# Patient Record
Sex: FEMALE | Race: BLACK OR AFRICAN AMERICAN | NOT HISPANIC OR LATINO | Employment: FULL TIME | URBAN - METROPOLITAN AREA
[De-identification: names, ages, dates, MRNs, and addresses within clinical notes are randomized per-mention and may not be internally consistent; named-entity substitution may affect disease eponyms.]

---

## 2022-06-09 NOTE — PROGRESS NOTES
History & Physical    CHIEF COMPLAINT:  ABDOMINAL HERNIA     History of Present Illness:  31 year-old-female referred by Dr. Vásquez to evaluate a possible abdominal hernia.  Patient reports abdominal wall swelling in the periumbilical and epigastric region, exacerbated with straining and Valsalva.  She also reports occasional upper abdominal discomfort and bloating, especially after meals, mild nausea.  She denies emesis, constipation or obstructive symptoms.  She denies fevers chills or symptoms of jaundice.  She is 4 months postpartum, she is not planning further pregnancies at this time.  She reports no abdominal wall surgery or previous hernia repair.  I reviewed OBGYN progress notes discussed with them for coordination of care    Greater than 30 minutes was required for complete chart review, patient consultation and documentation      Review of patient's allergies indicates:   Allergen Reactions    Sulfa (sulfonamide antibiotics)      Other reaction(s): rash       No current outpatient medications on file.     No current facility-administered medications for this visit.       Past Medical History:   Diagnosis Date    Beta thalassemia     Ectopic pregnancy      Past Surgical History:   Procedure Laterality Date     SECTION      SALPINGECTOMY Right      No family history on file.        Review of Systems:  Review of Systems   Constitutional: Negative for activity change, appetite change, chills, diaphoresis, fatigue, fever and unexpected weight change.   HENT: Negative for congestion, drooling, ear discharge, ear pain, facial swelling, hearing loss, sinus pressure, sneezing and voice change.    Eyes: Negative for pain, discharge, redness, itching and visual disturbance.   Respiratory: Negative for apnea, cough, choking, chest tightness, shortness of breath and stridor.    Cardiovascular: Negative for chest pain, palpitations and leg swelling.   Gastrointestinal: Positive for abdominal distention  and abdominal pain. Negative for anal bleeding, blood in stool, constipation, diarrhea, nausea, rectal pain and vomiting.   Endocrine: Negative for cold intolerance, heat intolerance and polyuria.   Genitourinary: Negative for difficulty urinating, dyspareunia, dysuria, flank pain, frequency, hematuria, menstrual problem, vaginal bleeding and vaginal discharge.   Musculoskeletal: Negative for arthralgias, gait problem, joint swelling, myalgias, neck pain and neck stiffness.   Skin: Negative for color change, pallor and rash.   Neurological: Negative for dizziness, tremors, seizures, syncope, facial asymmetry, speech difficulty, light-headedness, numbness and headaches.   Hematological: Negative for adenopathy.   Psychiatric/Behavioral: Negative for agitation, behavioral problems and confusion.       OBJECTIVE:     Vital Signs (Most Recent)              Physical Exam:  Physical Exam  Constitutional:       General: She is not in acute distress.     Appearance: Normal appearance. She is not ill-appearing, toxic-appearing or diaphoretic.   HENT:      Head: Normocephalic and atraumatic.      Right Ear: External ear normal.      Left Ear: External ear normal.      Nose: No congestion or rhinorrhea.      Mouth/Throat:      Mouth: Mucous membranes are moist.      Pharynx: No oropharyngeal exudate or posterior oropharyngeal erythema.   Eyes:      General: No scleral icterus.     Conjunctiva/sclera: Conjunctivae normal.      Pupils: Pupils are equal, round, and reactive to light.   Neck:      Vascular: No carotid bruit.   Cardiovascular:      Rate and Rhythm: Normal rate and regular rhythm.      Pulses: Normal pulses.      Heart sounds: No murmur heard.    No friction rub. No gallop.   Pulmonary:      Effort: Pulmonary effort is normal. No respiratory distress.      Breath sounds: Normal breath sounds. No stridor. No wheezing or rhonchi.   Abdominal:      General: Abdomen is flat. There is no distension.      Palpations:  Abdomen is soft. There is no mass.      Tenderness: There is no abdominal tenderness. There is no right CVA tenderness, left CVA tenderness, guarding or rebound.      Hernia: No hernia is present.      Comments: Fullness with possible defect in the a abdominal wall at the umbilicus and in the epigastrium.  Due to the flaccid nature of her abdomen at this time it is difficult to delineate whether there is a fascial defect or not.   Musculoskeletal:         General: No swelling, tenderness or deformity. Normal range of motion.      Cervical back: Normal range of motion and neck supple. No rigidity or tenderness.      Right lower leg: No edema.      Left lower leg: No edema.   Skin:     General: Skin is warm.      Capillary Refill: Capillary refill takes less than 2 seconds.      Coloration: Skin is not jaundiced or pale.      Findings: No bruising, erythema or rash.   Neurological:      General: No focal deficit present.      Mental Status: She is alert and oriented to person, place, and time.      Gait: Gait normal.   Psychiatric:         Mood and Affect: Mood normal.         Behavior: Behavior normal.         Thought Content: Thought content normal.         Judgment: Judgment normal.           ASSESSMENT/PLAN:     Periumbilical upper abdominal wall bulging, unable to accurately identify fascial defect due to abdominal wall laxity  CT scan to further assess for abdominal wall defects/hernias  Patient with upper abdominal bloating and discomfort especially after meals, will obtain right upper quadrant/gallbladder ultrasound    Return to clinic to discuss results

## 2022-06-14 ENCOUNTER — OFFICE VISIT (OUTPATIENT)
Dept: SURGICAL ONCOLOGY | Facility: CLINIC | Age: 32
End: 2022-06-14
Payer: COMMERCIAL

## 2022-06-14 VITALS
HEART RATE: 83 BPM | HEIGHT: 60 IN | BODY MASS INDEX: 22.34 KG/M2 | WEIGHT: 113.81 LBS | SYSTOLIC BLOOD PRESSURE: 104 MMHG | DIASTOLIC BLOOD PRESSURE: 69 MMHG

## 2022-06-14 DIAGNOSIS — R10.11 RIGHT UPPER QUADRANT ABDOMINAL PAIN: ICD-10-CM

## 2022-06-14 DIAGNOSIS — R19.00 ABDOMINAL WALL SWELLING: Primary | ICD-10-CM

## 2022-06-14 PROCEDURE — 3008F PR BODY MASS INDEX (BMI) DOCUMENTED: ICD-10-PCS | Mod: CPTII,S$GLB,, | Performed by: SURGERY

## 2022-06-14 PROCEDURE — 1159F MED LIST DOCD IN RCRD: CPT | Mod: CPTII,S$GLB,, | Performed by: SURGERY

## 2022-06-14 PROCEDURE — 3008F BODY MASS INDEX DOCD: CPT | Mod: CPTII,S$GLB,, | Performed by: SURGERY

## 2022-06-14 PROCEDURE — 3074F SYST BP LT 130 MM HG: CPT | Mod: CPTII,S$GLB,, | Performed by: SURGERY

## 2022-06-14 PROCEDURE — 1159F PR MEDICATION LIST DOCUMENTED IN MEDICAL RECORD: ICD-10-PCS | Mod: CPTII,S$GLB,, | Performed by: SURGERY

## 2022-06-14 PROCEDURE — 3078F DIAST BP <80 MM HG: CPT | Mod: CPTII,S$GLB,, | Performed by: SURGERY

## 2022-06-14 PROCEDURE — 99999 PR PBB SHADOW E&M-EST. PATIENT-LVL II: CPT | Mod: PBBFAC,,, | Performed by: SURGERY

## 2022-06-14 PROCEDURE — 99203 OFFICE O/P NEW LOW 30 MIN: CPT | Mod: S$GLB,,, | Performed by: SURGERY

## 2022-06-14 PROCEDURE — 99203 PR OFFICE/OUTPT VISIT, NEW, LEVL III, 30-44 MIN: ICD-10-PCS | Mod: S$GLB,,, | Performed by: SURGERY

## 2022-06-14 PROCEDURE — 99999 PR PBB SHADOW E&M-EST. PATIENT-LVL II: ICD-10-PCS | Mod: PBBFAC,,, | Performed by: SURGERY

## 2022-06-14 PROCEDURE — 3074F PR MOST RECENT SYSTOLIC BLOOD PRESSURE < 130 MM HG: ICD-10-PCS | Mod: CPTII,S$GLB,, | Performed by: SURGERY

## 2022-06-14 PROCEDURE — 3078F PR MOST RECENT DIASTOLIC BLOOD PRESSURE < 80 MM HG: ICD-10-PCS | Mod: CPTII,S$GLB,, | Performed by: SURGERY

## 2022-06-29 ENCOUNTER — OFFICE VISIT (OUTPATIENT)
Dept: SURGICAL ONCOLOGY | Facility: CLINIC | Age: 32
End: 2022-06-29
Payer: COMMERCIAL

## 2022-06-29 VITALS
HEART RATE: 79 BPM | DIASTOLIC BLOOD PRESSURE: 76 MMHG | HEIGHT: 60 IN | WEIGHT: 113 LBS | BODY MASS INDEX: 22.19 KG/M2 | SYSTOLIC BLOOD PRESSURE: 116 MMHG

## 2022-06-29 DIAGNOSIS — K43.9 EPIGASTRIC HERNIA: Primary | ICD-10-CM

## 2022-06-29 PROCEDURE — 99999 PR PBB SHADOW E&M-EST. PATIENT-LVL II: ICD-10-PCS | Mod: PBBFAC,,, | Performed by: SURGERY

## 2022-06-29 PROCEDURE — 99213 PR OFFICE/OUTPT VISIT, EST, LEVL III, 20-29 MIN: ICD-10-PCS | Mod: S$GLB,,, | Performed by: SURGERY

## 2022-06-29 PROCEDURE — 3074F PR MOST RECENT SYSTOLIC BLOOD PRESSURE < 130 MM HG: ICD-10-PCS | Mod: CPTII,S$GLB,, | Performed by: SURGERY

## 2022-06-29 PROCEDURE — 3074F SYST BP LT 130 MM HG: CPT | Mod: CPTII,S$GLB,, | Performed by: SURGERY

## 2022-06-29 PROCEDURE — 99999 PR PBB SHADOW E&M-EST. PATIENT-LVL II: CPT | Mod: PBBFAC,,, | Performed by: SURGERY

## 2022-06-29 PROCEDURE — 3078F PR MOST RECENT DIASTOLIC BLOOD PRESSURE < 80 MM HG: ICD-10-PCS | Mod: CPTII,S$GLB,, | Performed by: SURGERY

## 2022-06-29 PROCEDURE — 3008F BODY MASS INDEX DOCD: CPT | Mod: CPTII,S$GLB,, | Performed by: SURGERY

## 2022-06-29 PROCEDURE — 99213 OFFICE O/P EST LOW 20 MIN: CPT | Mod: S$GLB,,, | Performed by: SURGERY

## 2022-06-29 PROCEDURE — 1159F PR MEDICATION LIST DOCUMENTED IN MEDICAL RECORD: ICD-10-PCS | Mod: CPTII,S$GLB,, | Performed by: SURGERY

## 2022-06-29 PROCEDURE — 3008F PR BODY MASS INDEX (BMI) DOCUMENTED: ICD-10-PCS | Mod: CPTII,S$GLB,, | Performed by: SURGERY

## 2022-06-29 PROCEDURE — 3078F DIAST BP <80 MM HG: CPT | Mod: CPTII,S$GLB,, | Performed by: SURGERY

## 2022-06-29 PROCEDURE — 1159F MED LIST DOCD IN RCRD: CPT | Mod: CPTII,S$GLB,, | Performed by: SURGERY

## 2022-06-29 NOTE — PROGRESS NOTES
History & Physical    CHIEF COMPLAINT:  ABDOMINAL HERNIA     History of Present Illness:  31 year-old-female referred by Dr. Vásquez to evaluate a possible abdominal hernia.  Patient reports abdominal wall swelling in the periumbilical and epigastric region, exacerbated with straining and Valsalva.  She also reports occasional upper abdominal discomfort and bloating, especially after meals, mild nausea.  She denies emesis, constipation or obstructive symptoms.  She denies fevers chills or symptoms of jaundice.  She is 4 months postpartum, she is not planning further pregnancies at this time.  She reports no abdominal wall surgery or previous hernia repair.  I reviewed OBGYN progress notes discussed with them for coordination of care    The patient presents after CT scan and ultrasound.  I reviewed and interpreted the images and the reports.  There is no evidence of cholelithiasis or other abnormality on ultrasound.  On CT scan there is a shallow broad-based midline supraumbilical ventral abdominal wall hernia.  Is no evidence of bowel involvement or obstruction.  I discussed these results with her at length today.      Review of patient's allergies indicates:   Allergen Reactions    Sulfa (sulfonamide antibiotics)      Other reaction(s): rash       No current outpatient medications on file.     No current facility-administered medications for this visit.       Past Medical History:   Diagnosis Date    Beta thalassemia     Ectopic pregnancy      Past Surgical History:   Procedure Laterality Date     SECTION      SALPINGECTOMY Right      History reviewed. No pertinent family history.        Review of Systems:  Review of Systems   Constitutional: Negative for activity change, appetite change, chills, diaphoresis, fatigue, fever and unexpected weight change.   HENT: Negative for congestion, drooling, ear discharge, ear pain, facial swelling, hearing loss, sinus pressure, sneezing and voice change.     Eyes: Negative for pain, discharge, redness, itching and visual disturbance.   Respiratory: Negative for apnea, cough, choking, chest tightness, shortness of breath and stridor.    Cardiovascular: Negative for chest pain, palpitations and leg swelling.   Gastrointestinal: Positive for abdominal distention and abdominal pain. Negative for anal bleeding, blood in stool, constipation, diarrhea, nausea, rectal pain and vomiting.   Endocrine: Negative for cold intolerance, heat intolerance and polyuria.   Genitourinary: Negative for difficulty urinating, dyspareunia, dysuria, flank pain, frequency, hematuria, menstrual problem, vaginal bleeding and vaginal discharge.   Musculoskeletal: Negative for arthralgias, gait problem, joint swelling, myalgias, neck pain and neck stiffness.   Skin: Negative for color change, pallor and rash.   Neurological: Negative for dizziness, tremors, seizures, syncope, facial asymmetry, speech difficulty, light-headedness, numbness and headaches.   Hematological: Negative for adenopathy.   Psychiatric/Behavioral: Negative for agitation, behavioral problems and confusion.       OBJECTIVE:     Vital Signs (Most Recent)  Pulse: 79 (06/29/22 1515)  BP: 116/76 (06/29/22 1515)  5' (1.524 m)  51.3 kg (113 lb)     Physical Exam:  Physical Exam  Constitutional:       General: She is not in acute distress.     Appearance: Normal appearance. She is not ill-appearing, toxic-appearing or diaphoretic.   HENT:      Head: Normocephalic and atraumatic.      Right Ear: External ear normal.      Left Ear: External ear normal.      Nose: No congestion or rhinorrhea.      Mouth/Throat:      Mouth: Mucous membranes are moist.      Pharynx: No oropharyngeal exudate or posterior oropharyngeal erythema.   Eyes:      General: No scleral icterus.     Conjunctiva/sclera: Conjunctivae normal.      Pupils: Pupils are equal, round, and reactive to light.   Neck:      Vascular: No carotid bruit.   Cardiovascular:       Rate and Rhythm: Normal rate and regular rhythm.      Pulses: Normal pulses.      Heart sounds: No murmur heard.    No friction rub. No gallop.   Pulmonary:      Effort: Pulmonary effort is normal. No respiratory distress.      Breath sounds: Normal breath sounds. No stridor. No wheezing or rhonchi.   Abdominal:      General: Abdomen is flat. There is no distension.      Palpations: Abdomen is soft. There is no mass.      Tenderness: There is no abdominal tenderness. There is no right CVA tenderness, left CVA tenderness, guarding or rebound.      Hernia: No hernia is present.      Comments: Fullness with possible defect in the a abdominal wall at the umbilicus and in the epigastrium.  Due to the flaccid nature of her abdomen at this time it is difficult to delineate whether there is a fascial defect or not.   Musculoskeletal:         General: No swelling, tenderness or deformity. Normal range of motion.      Cervical back: Normal range of motion and neck supple. No rigidity or tenderness.      Right lower leg: No edema.      Left lower leg: No edema.   Skin:     General: Skin is warm.      Capillary Refill: Capillary refill takes less than 2 seconds.      Coloration: Skin is not jaundiced or pale.      Findings: No bruising, erythema or rash.   Neurological:      General: No focal deficit present.      Mental Status: She is alert and oriented to person, place, and time.      Gait: Gait normal.   Psychiatric:         Mood and Affect: Mood normal.         Behavior: Behavior normal.         Thought Content: Thought content normal.         Judgment: Judgment normal.           ASSESSMENT/PLAN:     Epigastric ventral hernia, reducible  We discussed options including repair versus observation.  She will discuss things with her  and call us if she decides to pursue surgical intervention.

## 2023-05-25 LAB
ANTIBODY SCREEN: NEGATIVE
C TRACH RRNA SPEC QL PROBE: NEGATIVE
HBV SURFACE AG SERPL QL IA: NEGATIVE
HCT VFR BLD AUTO: 32.2 % (ref 36–46)
HGB BLD-MCNC: 9.9 G/DL (ref 12–16)
HIV 1+2 AB+HIV1 P24 AG SERPL QL IA: NON REACTIVE
N GONORRHOEAE, AMPLIFIED DNA: NEGATIVE
PLATELET # BLD AUTO: 207 K/UL (ref 150–399)
RPR: NON REACTIVE
RUBELLA IMMUNE STATUS: NORMAL

## 2023-07-11 LAB — PRENATAL STREP B CULTURE: POSITIVE

## 2023-12-11 ENCOUNTER — ANESTHESIA (OUTPATIENT)
Dept: ANESTHESIOLOGY | Facility: HOSPITAL | Age: 33
End: 2023-12-11

## 2023-12-11 ENCOUNTER — ANESTHESIA EVENT (OUTPATIENT)
Dept: ANESTHESIOLOGY | Facility: HOSPITAL | Age: 33
End: 2023-12-11

## 2023-12-27 ENCOUNTER — ANESTHESIA EVENT (OUTPATIENT)
Dept: OBSTETRICS AND GYNECOLOGY | Facility: HOSPITAL | Age: 33
End: 2023-12-27
Payer: COMMERCIAL

## 2023-12-31 ENCOUNTER — HOSPITAL ENCOUNTER (INPATIENT)
Facility: HOSPITAL | Age: 33
LOS: 2 days | Discharge: HOME | End: 2024-01-02
Attending: SPECIALIST | Admitting: OBSTETRICS & GYNECOLOGY
Payer: COMMERCIAL

## 2023-12-31 DIAGNOSIS — O26.899 ABDOMINAL PAIN AFFECTING PREGNANCY: ICD-10-CM

## 2023-12-31 DIAGNOSIS — R10.9 ABDOMINAL PAIN AFFECTING PREGNANCY: ICD-10-CM

## 2023-12-31 PROBLEM — Z98.891 PREVIOUS CESAREAN SECTION: Status: ACTIVE | Noted: 2023-12-31

## 2023-12-31 LAB
BASOPHILS # BLD AUTO: 0.03 X10(3)/MCL
BASOPHILS NFR BLD AUTO: 0.2 %
EOSINOPHIL # BLD AUTO: 0.01 X10(3)/MCL (ref 0–0.9)
EOSINOPHIL NFR BLD AUTO: 0.1 %
ERYTHROCYTE [DISTWIDTH] IN BLOOD BY AUTOMATED COUNT: 15.1 % (ref 11.5–17)
GROUP & RH: NORMAL
HCT VFR BLD AUTO: 30.4 % (ref 37–47)
HGB BLD-MCNC: 9.3 G/DL (ref 12–16)
IMM GRANULOCYTES # BLD AUTO: 0.1 X10(3)/MCL (ref 0–0.04)
IMM GRANULOCYTES NFR BLD AUTO: 0.5 %
INDIRECT COOMBS: NORMAL
LYMPHOCYTES # BLD AUTO: 1.37 X10(3)/MCL (ref 0.6–4.6)
LYMPHOCYTES NFR BLD AUTO: 7 %
MCH RBC QN AUTO: 23.1 PG (ref 27–31)
MCHC RBC AUTO-ENTMCNC: 30.6 G/DL (ref 33–36)
MCV RBC AUTO: 75.4 FL (ref 80–94)
MONOCYTES # BLD AUTO: 0.91 X10(3)/MCL (ref 0.1–1.3)
MONOCYTES NFR BLD AUTO: 4.7 %
NEUTROPHILS # BLD AUTO: 17.03 X10(3)/MCL (ref 2.1–9.2)
NEUTROPHILS NFR BLD AUTO: 87.5 %
NRBC BLD AUTO-RTO: 0.1 %
PLATELET # BLD AUTO: 169 X10(3)/MCL (ref 130–400)
PLATELETS.RETICULATED NFR BLD AUTO: 16.3 % (ref 0.9–11.2)
PMV BLD AUTO: ABNORMAL FL
RBC # BLD AUTO: 4.03 X10(6)/MCL (ref 4.2–5.4)
SPECIMEN OUTDATE: NORMAL
T PALLIDUM AB SER QL: NONREACTIVE
WBC # SPEC AUTO: 19.45 X10(3)/MCL (ref 4.5–11.5)

## 2023-12-31 PROCEDURE — 25000003 PHARM REV CODE 250: Performed by: OBSTETRICS & GYNECOLOGY

## 2023-12-31 PROCEDURE — 37000009 HC ANESTHESIA EA ADD 15 MINS: Performed by: OBSTETRICS & GYNECOLOGY

## 2023-12-31 PROCEDURE — 59510 PRA FULL ROUT OBSTE CARE,CESAREAN DELIV: ICD-10-PCS | Mod: QY,ANES,, | Performed by: ANESTHESIOLOGY

## 2023-12-31 PROCEDURE — 86780 TREPONEMA PALLIDUM: CPT | Performed by: OBSTETRICS & GYNECOLOGY

## 2023-12-31 PROCEDURE — 37000008 HC ANESTHESIA 1ST 15 MINUTES: Performed by: OBSTETRICS & GYNECOLOGY

## 2023-12-31 PROCEDURE — 63600175 PHARM REV CODE 636 W HCPCS: Performed by: NURSE ANESTHETIST, CERTIFIED REGISTERED

## 2023-12-31 PROCEDURE — 27000492 HC SLEEVE, SCD T/L

## 2023-12-31 PROCEDURE — 51702 INSERT TEMP BLADDER CATH: CPT

## 2023-12-31 PROCEDURE — 85025 COMPLETE CBC W/AUTO DIFF WBC: CPT | Performed by: OBSTETRICS & GYNECOLOGY

## 2023-12-31 PROCEDURE — 36004725 HC OB OR TIME LEV III - EA ADD 15 MIN: Performed by: OBSTETRICS & GYNECOLOGY

## 2023-12-31 PROCEDURE — 86850 RBC ANTIBODY SCREEN: CPT | Performed by: OBSTETRICS & GYNECOLOGY

## 2023-12-31 PROCEDURE — 0UB60ZZ EXCISION OF LEFT FALLOPIAN TUBE, OPEN APPROACH: ICD-10-PCS | Performed by: OBSTETRICS & GYNECOLOGY

## 2023-12-31 PROCEDURE — 59510 CESAREAN DELIVERY: CPT | Mod: QY,ANES,, | Performed by: ANESTHESIOLOGY

## 2023-12-31 PROCEDURE — 36004724 HC OB OR TIME LEV III - 1ST 15 MIN: Performed by: OBSTETRICS & GYNECOLOGY

## 2023-12-31 PROCEDURE — 99900035 HC TECH TIME PER 15 MIN (STAT)

## 2023-12-31 PROCEDURE — 62322 NJX INTERLAMINAR LMBR/SAC: CPT | Performed by: ANESTHESIOLOGY

## 2023-12-31 PROCEDURE — 99900059 HC C-SECTION ATTEND (STAT)

## 2023-12-31 PROCEDURE — 59510 PRA FULL ROUT OBSTE CARE,CESAREAN DELIV: ICD-10-PCS | Mod: QX,CRNA,, | Performed by: NURSE ANESTHETIST, CERTIFIED REGISTERED

## 2023-12-31 PROCEDURE — 11000001 HC ACUTE MED/SURG PRIVATE ROOM

## 2023-12-31 PROCEDURE — 63600175 PHARM REV CODE 636 W HCPCS: Performed by: OBSTETRICS & GYNECOLOGY

## 2023-12-31 PROCEDURE — 59510 CESAREAN DELIVERY: CPT | Mod: QX,CRNA,, | Performed by: NURSE ANESTHETIST, CERTIFIED REGISTERED

## 2023-12-31 PROCEDURE — 99285 EMERGENCY DEPT VISIT HI MDM: CPT | Mod: 25

## 2023-12-31 PROCEDURE — 25000003 PHARM REV CODE 250: Performed by: NURSE ANESTHETIST, CERTIFIED REGISTERED

## 2023-12-31 PROCEDURE — 27201423 OPTIME MED/SURG SUP & DEVICES STERILE SUPPLY: Performed by: OBSTETRICS & GYNECOLOGY

## 2023-12-31 PROCEDURE — 63600175 PHARM REV CODE 636 W HCPCS: Performed by: ANESTHESIOLOGY

## 2023-12-31 PROCEDURE — 71000033 HC RECOVERY, INTIAL HOUR: Performed by: OBSTETRICS & GYNECOLOGY

## 2023-12-31 RX ORDER — PHENYLEPHRINE HYDROCHLORIDE 10 MG/ML
INJECTION INTRAVENOUS
Status: DISCONTINUED | OUTPATIENT
Start: 2023-12-31 | End: 2023-12-31

## 2023-12-31 RX ORDER — OXYTOCIN 10 [USP'U]/ML
INJECTION, SOLUTION INTRAMUSCULAR; INTRAVENOUS
Status: DISCONTINUED | OUTPATIENT
Start: 2023-12-31 | End: 2023-12-31

## 2023-12-31 RX ORDER — FENTANYL CITRATE 50 UG/ML
INJECTION, SOLUTION INTRAMUSCULAR; INTRAVENOUS
Status: DISCONTINUED | OUTPATIENT
Start: 2023-12-31 | End: 2023-12-31

## 2023-12-31 RX ORDER — DIPHENHYDRAMINE HCL 25 MG
25 CAPSULE ORAL EVERY 4 HOURS PRN
Status: DISCONTINUED | OUTPATIENT
Start: 2023-12-31 | End: 2024-01-02 | Stop reason: HOSPADM

## 2023-12-31 RX ORDER — PROMETHAZINE HYDROCHLORIDE 25 MG/1
25 TABLET ORAL EVERY 6 HOURS PRN
Status: DISCONTINUED | OUTPATIENT
Start: 2023-12-31 | End: 2024-01-02 | Stop reason: HOSPADM

## 2023-12-31 RX ORDER — OXYTOCIN 10 [USP'U]/ML
10 INJECTION, SOLUTION INTRAMUSCULAR; INTRAVENOUS ONCE AS NEEDED
Status: DISCONTINUED | OUTPATIENT
Start: 2023-12-31 | End: 2024-01-02 | Stop reason: HOSPADM

## 2023-12-31 RX ORDER — ONDANSETRON 2 MG/ML
INJECTION INTRAMUSCULAR; INTRAVENOUS
Status: DISCONTINUED | OUTPATIENT
Start: 2023-12-31 | End: 2023-12-31

## 2023-12-31 RX ORDER — SIMETHICONE 80 MG
1 TABLET,CHEWABLE ORAL EVERY 6 HOURS PRN
Status: DISCONTINUED | OUTPATIENT
Start: 2023-12-31 | End: 2024-01-02 | Stop reason: HOSPADM

## 2023-12-31 RX ORDER — DIPHENOXYLATE HYDROCHLORIDE AND ATROPINE SULFATE 2.5; .025 MG/1; MG/1
2 TABLET ORAL EVERY 6 HOURS PRN
Status: DISCONTINUED | OUTPATIENT
Start: 2023-12-31 | End: 2024-01-02 | Stop reason: HOSPADM

## 2023-12-31 RX ORDER — KETOROLAC TROMETHAMINE 30 MG/ML
INJECTION, SOLUTION INTRAMUSCULAR; INTRAVENOUS
Status: DISCONTINUED | OUTPATIENT
Start: 2023-12-31 | End: 2023-12-31

## 2023-12-31 RX ORDER — FAMOTIDINE 10 MG/ML
20 INJECTION INTRAVENOUS
Status: DISCONTINUED | OUTPATIENT
Start: 2023-12-31 | End: 2023-12-31

## 2023-12-31 RX ORDER — DOCUSATE SODIUM 100 MG/1
200 CAPSULE, LIQUID FILLED ORAL 2 TIMES DAILY
Status: DISCONTINUED | OUTPATIENT
Start: 2023-12-31 | End: 2024-01-02 | Stop reason: HOSPADM

## 2023-12-31 RX ORDER — PROCHLORPERAZINE EDISYLATE 5 MG/ML
5 INJECTION INTRAMUSCULAR; INTRAVENOUS EVERY 6 HOURS PRN
Status: DISCONTINUED | OUTPATIENT
Start: 2023-12-31 | End: 2024-01-02 | Stop reason: HOSPADM

## 2023-12-31 RX ORDER — BUPIVACAINE HYDROCHLORIDE 7.5 MG/ML
INJECTION, SOLUTION EPIDURAL; RETROBULBAR
Status: DISCONTINUED | OUTPATIENT
Start: 2023-12-31 | End: 2023-12-31

## 2023-12-31 RX ORDER — CARBOPROST TROMETHAMINE 250 UG/ML
250 INJECTION, SOLUTION INTRAMUSCULAR
Status: DISCONTINUED | OUTPATIENT
Start: 2023-12-31 | End: 2023-12-31

## 2023-12-31 RX ORDER — OXYTOCIN/RINGER'S LACTATE 30/500 ML
334 PLASTIC BAG, INJECTION (ML) INTRAVENOUS ONCE
Status: DISCONTINUED | OUTPATIENT
Start: 2023-12-31 | End: 2023-12-31

## 2023-12-31 RX ORDER — MISOPROSTOL 100 UG/1
800 TABLET ORAL
Status: DISCONTINUED | OUTPATIENT
Start: 2023-12-31 | End: 2023-12-31

## 2023-12-31 RX ORDER — CEFAZOLIN SODIUM 2 G/50ML
2 SOLUTION INTRAVENOUS
Status: DISCONTINUED | OUTPATIENT
Start: 2023-12-31 | End: 2023-12-31

## 2023-12-31 RX ORDER — METHYLERGONOVINE MALEATE 0.2 MG/ML
200 INJECTION INTRAVENOUS ONCE AS NEEDED
Status: DISCONTINUED | OUTPATIENT
Start: 2023-12-31 | End: 2024-01-02 | Stop reason: HOSPADM

## 2023-12-31 RX ORDER — OXYTOCIN/RINGER'S LACTATE 30/500 ML
95 PLASTIC BAG, INJECTION (ML) INTRAVENOUS ONCE
Status: DISCONTINUED | OUTPATIENT
Start: 2023-12-31 | End: 2024-01-02 | Stop reason: HOSPADM

## 2023-12-31 RX ORDER — KETOROLAC TROMETHAMINE 30 MG/ML
30 INJECTION, SOLUTION INTRAMUSCULAR; INTRAVENOUS EVERY 8 HOURS
Status: COMPLETED | OUTPATIENT
Start: 2023-12-31 | End: 2024-01-01

## 2023-12-31 RX ORDER — EPHEDRINE SULFATE 50 MG/ML
INJECTION, SOLUTION INTRAVENOUS
Status: DISCONTINUED | OUTPATIENT
Start: 2023-12-31 | End: 2023-12-31

## 2023-12-31 RX ORDER — MISOPROSTOL 100 UG/1
800 TABLET ORAL ONCE AS NEEDED
Status: DISCONTINUED | OUTPATIENT
Start: 2023-12-31 | End: 2024-01-02 | Stop reason: HOSPADM

## 2023-12-31 RX ORDER — PRENATAL WITH FERROUS FUM AND FOLIC ACID 3080; 920; 120; 400; 22; 1.84; 3; 20; 10; 1; 12; 200; 27; 25; 2 [IU]/1; [IU]/1; MG/1; [IU]/1; MG/1; MG/1; MG/1; MG/1; MG/1; MG/1; UG/1; MG/1; MG/1; MG/1; MG/1
1 TABLET ORAL DAILY
Status: DISCONTINUED | OUTPATIENT
Start: 2023-12-31 | End: 2024-01-02 | Stop reason: HOSPADM

## 2023-12-31 RX ORDER — FACIAL-BODY WIPES
10 EACH TOPICAL ONCE AS NEEDED
Status: DISCONTINUED | OUTPATIENT
Start: 2023-12-31 | End: 2024-01-02 | Stop reason: HOSPADM

## 2023-12-31 RX ORDER — MUPIROCIN 20 MG/G
OINTMENT TOPICAL
Status: DISCONTINUED | OUTPATIENT
Start: 2023-12-31 | End: 2024-01-02 | Stop reason: HOSPADM

## 2023-12-31 RX ORDER — OXYTOCIN/RINGER'S LACTATE 30/500 ML
95 PLASTIC BAG, INJECTION (ML) INTRAVENOUS ONCE AS NEEDED
Status: DISCONTINUED | OUTPATIENT
Start: 2023-12-31 | End: 2024-01-02 | Stop reason: HOSPADM

## 2023-12-31 RX ORDER — METHYLERGONOVINE MALEATE 0.2 MG/ML
200 INJECTION INTRAVENOUS
Status: DISCONTINUED | OUTPATIENT
Start: 2023-12-31 | End: 2023-12-31

## 2023-12-31 RX ORDER — ADHESIVE BANDAGE
30 BANDAGE TOPICAL 2 TIMES DAILY PRN
Status: DISCONTINUED | OUTPATIENT
Start: 2024-01-01 | End: 2024-01-02 | Stop reason: HOSPADM

## 2023-12-31 RX ORDER — AMOXICILLIN 250 MG
1 CAPSULE ORAL NIGHTLY PRN
Status: DISCONTINUED | OUTPATIENT
Start: 2023-12-31 | End: 2024-01-02 | Stop reason: HOSPADM

## 2023-12-31 RX ORDER — CARBOPROST TROMETHAMINE 250 UG/ML
250 INJECTION, SOLUTION INTRAMUSCULAR
Status: DISCONTINUED | OUTPATIENT
Start: 2023-12-31 | End: 2024-01-02 | Stop reason: HOSPADM

## 2023-12-31 RX ORDER — OXYCODONE AND ACETAMINOPHEN 5; 325 MG/1; MG/1
1 TABLET ORAL EVERY 4 HOURS PRN
Status: DISCONTINUED | OUTPATIENT
Start: 2023-12-31 | End: 2024-01-02 | Stop reason: HOSPADM

## 2023-12-31 RX ORDER — OXYCODONE AND ACETAMINOPHEN 10; 325 MG/1; MG/1
1 TABLET ORAL EVERY 4 HOURS PRN
Status: DISCONTINUED | OUTPATIENT
Start: 2023-12-31 | End: 2024-01-02 | Stop reason: HOSPADM

## 2023-12-31 RX ORDER — NALOXONE HCL 0.4 MG/ML
0.04 VIAL (ML) INJECTION
Status: DISCONTINUED | OUTPATIENT
Start: 2023-12-31 | End: 2024-01-02 | Stop reason: HOSPADM

## 2023-12-31 RX ORDER — CEFAZOLIN SODIUM IN 0.9 % NACL 2 G/100 ML
PLASTIC BAG, INJECTION (ML) INTRAVENOUS
Status: DISCONTINUED | OUTPATIENT
Start: 2023-12-31 | End: 2023-12-31

## 2023-12-31 RX ORDER — OXYTOCIN/RINGER'S LACTATE 30/500 ML
334 PLASTIC BAG, INJECTION (ML) INTRAVENOUS ONCE AS NEEDED
Status: DISCONTINUED | OUTPATIENT
Start: 2023-12-31 | End: 2024-01-02 | Stop reason: HOSPADM

## 2023-12-31 RX ORDER — HYDROMORPHONE HYDROCHLORIDE 2 MG/ML
1 INJECTION, SOLUTION INTRAMUSCULAR; INTRAVENOUS; SUBCUTANEOUS EVERY 4 HOURS PRN
Status: DISCONTINUED | OUTPATIENT
Start: 2023-12-31 | End: 2024-01-02 | Stop reason: HOSPADM

## 2023-12-31 RX ORDER — SODIUM CHLORIDE, SODIUM LACTATE, POTASSIUM CHLORIDE, CALCIUM CHLORIDE 600; 310; 30; 20 MG/100ML; MG/100ML; MG/100ML; MG/100ML
INJECTION, SOLUTION INTRAVENOUS CONTINUOUS
Status: DISCONTINUED | OUTPATIENT
Start: 2023-12-31 | End: 2023-12-31

## 2023-12-31 RX ORDER — ACETAMINOPHEN 10 MG/ML
INJECTION, SOLUTION INTRAVENOUS
Status: DISCONTINUED | OUTPATIENT
Start: 2023-12-31 | End: 2023-12-31

## 2023-12-31 RX ORDER — IBUPROFEN 800 MG/1
800 TABLET ORAL EVERY 8 HOURS
Status: DISCONTINUED | OUTPATIENT
Start: 2024-01-01 | End: 2024-01-02 | Stop reason: HOSPADM

## 2023-12-31 RX ORDER — OXYTOCIN/RINGER'S LACTATE 30/500 ML
95 PLASTIC BAG, INJECTION (ML) INTRAVENOUS ONCE
Status: COMPLETED | OUTPATIENT
Start: 2023-12-31 | End: 2023-12-31

## 2023-12-31 RX ORDER — SODIUM CITRATE AND CITRIC ACID MONOHYDRATE 334; 500 MG/5ML; MG/5ML
30 SOLUTION ORAL
Status: DISCONTINUED | OUTPATIENT
Start: 2023-12-31 | End: 2023-12-31

## 2023-12-31 RX ORDER — MORPHINE SULFATE 0.5 MG/ML
INJECTION, SOLUTION EPIDURAL; INTRATHECAL; INTRAVENOUS
Status: DISCONTINUED | OUTPATIENT
Start: 2023-12-31 | End: 2023-12-31

## 2023-12-31 RX ORDER — ONDANSETRON 2 MG/ML
4 INJECTION INTRAMUSCULAR; INTRAVENOUS EVERY 12 HOURS PRN
Status: DISCONTINUED | OUTPATIENT
Start: 2023-12-31 | End: 2024-01-02 | Stop reason: HOSPADM

## 2023-12-31 RX ORDER — ONDANSETRON 4 MG/1
8 TABLET, ORALLY DISINTEGRATING ORAL EVERY 8 HOURS PRN
Status: DISCONTINUED | OUTPATIENT
Start: 2023-12-31 | End: 2024-01-02 | Stop reason: HOSPADM

## 2023-12-31 RX ADMIN — MORPHINE SULFATE 0.15 MG: 0.5 INJECTION, SOLUTION EPIDURAL; INTRATHECAL; INTRAVENOUS at 08:12

## 2023-12-31 RX ADMIN — OXYTOCIN 30 UNITS: 10 INJECTION, SOLUTION INTRAMUSCULAR; INTRAVENOUS at 09:12

## 2023-12-31 RX ADMIN — ONDANSETRON 4 MG: 2 INJECTION INTRAMUSCULAR; INTRAVENOUS at 08:12

## 2023-12-31 RX ADMIN — Medication 2 G: at 08:12

## 2023-12-31 RX ADMIN — FENTANYL CITRATE 10 MCG: 50 INJECTION, SOLUTION INTRAMUSCULAR; INTRAVENOUS at 08:12

## 2023-12-31 RX ADMIN — Medication 95 MILLI-UNITS/MIN: at 09:12

## 2023-12-31 RX ADMIN — KETOROLAC TROMETHAMINE 30 MG: 30 INJECTION, SOLUTION INTRAMUSCULAR; INTRAVENOUS at 02:12

## 2023-12-31 RX ADMIN — KETOROLAC TROMETHAMINE 30 MG: 30 INJECTION, SOLUTION INTRAMUSCULAR; INTRAVENOUS at 09:12

## 2023-12-31 RX ADMIN — EPHEDRINE SULFATE 25 MG: 50 INJECTION INTRAVENOUS at 08:12

## 2023-12-31 RX ADMIN — DOCUSATE SODIUM 200 MG: 100 CAPSULE, LIQUID FILLED ORAL at 08:12

## 2023-12-31 RX ADMIN — PHENYLEPHRINE HYDROCHLORIDE 100 MCG: 10 INJECTION INTRAVENOUS at 08:12

## 2023-12-31 RX ADMIN — BUPIVACAINE HYDROCHLORIDE 1.8 ML: 7.5 INJECTION, SOLUTION EPIDURAL; RETROBULBAR at 08:12

## 2023-12-31 RX ADMIN — SODIUM CHLORIDE, POTASSIUM CHLORIDE, SODIUM LACTATE AND CALCIUM CHLORIDE: 600; 310; 30; 20 INJECTION, SOLUTION INTRAVENOUS at 08:12

## 2023-12-31 RX ADMIN — SODIUM CITRATE AND CITRIC ACID MONOHYDRATE 30 ML: 500; 334 SOLUTION ORAL at 07:12

## 2023-12-31 RX ADMIN — ACETAMINOPHEN 1000 MG: 10 INJECTION, SOLUTION INTRAVENOUS at 09:12

## 2023-12-31 NOTE — ANESTHESIA POSTPROCEDURE EVALUATION
Anesthesia Post Evaluation    Patient:  Charla    Procedure(s) Performed: Procedure(s) (LRB):   SECTION (N/A)    Final Anesthesia Type: spinal      Patient location during evaluation: labor & delivery  Patient participation: Yes- Able to Participate  Level of consciousness: awake and alert  Post-procedure vital signs: reviewed and stable  Pain management: adequate  Airway patency: patent  ALISON mitigation strategies: Multimodal analgesia  PONV status at discharge: No PONV  Anesthetic complications: no      Cardiovascular status: stable  Respiratory status: unassisted  Hydration status: euvolemic  Follow-up not needed.              Vitals Value Taken Time   BP 93/59 23 1530   Temp 36.6 °C (97.8 °F) 23 1315   Pulse 66 23 1530   Resp 16 23 1430   SpO2 100 % 23 1025         Event Time   Out of Recovery 10:32:00         Pain/Ryan Score: Pain Rating Prior to Med Admin: 3 (2023  2:19 PM)  Ryan Score: 9 (2023 10:31 AM)

## 2023-12-31 NOTE — ANESTHESIA PROCEDURE NOTES
Spinal    Diagnosis: labor  Patient location during procedure: OB  Start time: 12/31/2023 8:42 AM  Timeout: 12/31/2023 8:42 AM  End time: 12/31/2023 8:47 AM    Staffing  Authorizing Provider: Jb Richardson MD  Performing Provider: Jb Richardson MD    Staffing  Performed by: Jb Richardson MD  Authorized by: Jb Richardson MD    Preanesthetic Checklist  Completed: patient identified, IV checked, site marked, risks and benefits discussed, surgical consent, monitors and equipment checked, pre-op evaluation and timeout performed  Spinal Block  Patient position: sitting  Prep: ChloraPrep  Patient monitoring: cardiac monitor, continuous pulse ox and frequent blood pressure checks  Approach: midline  Location: L4-5  Injection technique: single shot  CSF Fluid: clear free-flowing CSF  Needle  Needle type: pencil-tip   Needle gauge: 25 G  Needle length: 3.5 in  Additional Documentation: incremental injection, negative aspiration for heme and no paresthesia on injection  Needle localization: anatomical landmarks  Assessment  Sensory level: T4   Dermatomal levels determined by alcohol wipe  Ease of block: easy  Patient's tolerance of the procedure: comfortable throughout block and no complaints

## 2023-12-31 NOTE — L&D DELIVERY NOTE
Pre-op Diagnosis:   1.  Intrauterine Pregnancy at 38 5/7 WGA  2.  Previous c/s x 3  3.  Undesired Fertility  4.  Labor    Postop Diagnosis: Same  Procedure: Repeat Low Transverse  Section via Pfannenstiel incision and Left Salpingectomy  Surgeon: Marge Beyer MD  Anesthesia: Spinal  Complications: None  QBL: per RN  Findings: Normal uterus, tubes and ovaries.  Viable female infant with Apgars of 8,9 weighing 6lbs 4oz  Specimen: Placenta, left fallopian tube     Indication and Consent: Patient presented to L&D in labor with 3 previous  sections. I discussed risks, benefits and options with her in detail, including trial of labor.  She desired to proceed with a repeat  delivery. The patient understood that the risks of  section include, but are not limited to, visceral or vascular injury, infection, blood loss and the need for transfusion, prolonged hospitalization and reoperation. The patient also understood that tubal ligation is permanent sterilization.  There is also a small risk that the procedure will fail, which may result in future pregnancy or ectopic pregnancy. The patient stated understanding and desired to proceed.  All questions were answered.     Procedure: The patient was taken to the operating room where spinal anesthesia was found to be adequate.  Two grams of Ancef were given for infection prophylaxis.  She was prepped and draped in the dorsal supine position with a leftward tilt.  A pfannenstiel skin incision was made with the scalpel and carried down to the fascia with the bovie.  The fasica was incised and extended laterally.  The superior aspect of the fascia was grasped with the Kocher clamps.  The underlying rectus muscle was dissected off sharply with Childs scissors.  In a similar fashion, the inferior aspect of the fascia was elevated with the Kocher clamps and the rectus and pyramidalis muscles were dissected off.  Hemostasis was achieved with the bovie.  The  rectus muscle was  in the midline down to the level of the pubic symphysis.  Pre-peritoneal fatty tissue was bluntly dissected to expose the peritoneum.  The peritoneum was found to be free of adherent bowel and entered sharply with scissors.  The peritoneal incision was extended superiorly and inferiorly to the bladder reflection with good visualization of the bladder.  The velma retractor was inserted and vesicouterine peritoneum identified, then opened with scissors and the bladder flap was developed.  The lower uterine segment was incised with a scalpel.  The amniotic sac was ruptured and clear fluid was noted.  The uterine incision was extended bluntly with lateral and upward traction.    The fetus was in cephalic presentation.  The head was elevated out of the pelvis and gentle fundal pressure was applied once the head was brought to the incision.  The infant was delivered without difficulty.  The mouth and nose were suctioned with bulb suction.  The cord was clamped and cut.  The infant was handed off to waiting nursery personnel.  IV Pitocin was initiated to facilitate uterine contractions.  The placenta was delivered intact with manual massage of the uterine fundus.  The uterus was then exteriorized.  The inside of the uterus was gently wiped with a lap sponge to assure complete removal of placental membranes.  The uterine incision was closed with a 0 Vicryl suture in a running locked fashion.  The ovaries and tubes were found to be normal.    Attention was then turned to the salpingectomy portion of the procedure.  The left fallopian tube was grasped with a francis clamp and elevated. Enseal was used to remove fallopian tube from its attachments.  Blood clots and fluid were wiped out of the abdomen and pelvis with moist lap sponges.  The uterine incision and tubes were reinspected and good hemostasis was noted. The peritoneum was closed with 2-0 vicryl in a continuous running fashion.  The fascia  was closed with 1-0 Vicryl in a continuous running fashion.  The subcuticular tissue was irrigated with warm normal saline.  Skin was closed using 4-0 Monocryl in a subcuticular fashion.  The patient tolerated the procedure well.  All sponge and lap counts were correct times 2.  The patient was taken to the recovery room in stable condition

## 2023-12-31 NOTE — ANESTHESIA PREPROCEDURE EVALUATION
2023  Anastacia Dunham is a 33 y.o., female.    Anastacia Dunham is a 33 y.o.  female with IUP at 38w5d weeks gestation who presents to L&D in labor at 6cm. Pertinent medical history for this pregnancy includes 3 previous c/s and undesired fertility.  Care this pregnancy has been with Dr. Vásquez       Pre-op Assessment    I have reviewed the Patient Summary Reports.     I have reviewed the Nursing Notes. I have reviewed the NPO Status.   I have reviewed the Medications.     Review of Systems      Physical Exam  General: Well nourished    Airway:  Mallampati: II     Dental:  Intact    Chest/Lungs:  Clear to auscultation    Heart:  Rate: Normal        Anesthesia Plan  Type of Anesthesia, risks & benefits discussed:    Anesthesia Type: Spinal  Intra-op Monitoring Plan: Standard ASA Monitors  Post Op Pain Control Plan: intrathecal opioid  Informed Consent: Informed consent signed with the Patient and all parties understand the risks and agree with anesthesia plan.  All questions answered.   ASA Score: 2    Ready For Surgery From Anesthesia Perspective.     .

## 2023-12-31 NOTE — TRANSFER OF CARE
Anesthesia Transfer of Care Note    Patient:  Charla    Procedure(s) Performed: Procedure(s) (LRB):   SECTION (N/A)    Patient location: Labor and Delivery    Anesthesia Type: spinal    Transport from OR: Transported from OR on room air with adequate spontaneous ventilation    Post pain: adequate analgesia    Post assessment: no apparent anesthetic complications    Post vital signs: stable    Level of consciousness: awake    Nausea/Vomiting: no nausea/vomiting    Complications: none    Transfer of care protocol was followed      Last vitals: Visit Vitals  /69   Pulse 79   Temp 36.7 °C (98.1 °F)   Ht 5' (1.524 m)   Wt 58.1 kg (128 lb)   SpO2 100%   Breastfeeding No   BMI 25.00 kg/m²

## 2023-12-31 NOTE — H&P
HISTORY AND PHYSICAL                                                OBSTETRICS          Subjective:      Anastacia Dunham is a 33 y.o.  female with IUP at 38w5d weeks gestation who presents to L&D in labor at 6cm. Pertinent medical history for this pregnancy includes 3 previous c/s and undesired fertility.  Care this pregnancy has been with Dr. Vásquez    PMHx:   Past Medical History:   Diagnosis Date    Beta thalassemia     Ectopic pregnancy        PSHx:   Past Surgical History:   Procedure Laterality Date     SECTION      SALPINGECTOMY Right        All:   Review of patient's allergies indicates:   Allergen Reactions    Sulfa (sulfonamide antibiotics)      Other reaction(s): rash       Meds: (Not in a hospital admission)      SH:   Social History     Socioeconomic History    Marital status:        FH: No family history on file.    OBHx:   OB History    Para Term  AB Living   6 3 1 2 2 3   SAB IAB Ectopic Multiple Live Births   0 0 2 0 3      # Outcome Date GA Lbr Juan/2nd Weight Sex Delivery Anes PTL Lv   6 Current            5  22 36w0d    CS-LTranv   RAHAT   4 Ectopic 2020     ECTOPIC      3 Term 10/25/19 38w0d    CS-LTranv   RAHAT   2  10/01/17 36w0d    CS-LTranv   RAHAT   1 Ectopic 2014     ECTOPIC          Objective:      /69   Pulse 80   SpO2 100%   Breastfeeding No   There is no height or weight on file to calculate BMI.    General:   alert and cooperative   HEENT:  normocephalic, atraumatic   Lungs:   clear to auscultation bilaterally   Heart:   regular rate and rhythm, S1, S2 normal   Abdomen:  gravid, non-tender   Extremities non-tender, no edema   Derm: no rashes or lesions   Psych: appropriate mood and affect   FHT: Reassuring per nursing staff       Assessment:     33 y.o.  at 38w5d weeks gestation here for repeat  delivery  2. H/o  delivery x 3  3. Undesired fertility    Plan:        1. Risks, benefits,  alternatives and possible complications have been discussed in detail with the patient. All questions have been answered, and Ms. Dunham has voiced understanding and agrees to the treatment plan.  2. Consents signed and in chart  3. Admit to Labor and Delivery unit for repeat  delivery  4. Antibiotics per protocol and SCDs for prophylaxis  5. Salpingectomy reviewed with patient - aware of permanent procedure. R/B/A  reviewed and consents signed.

## 2024-01-01 LAB
BASOPHILS # BLD AUTO: 0.02 X10(3)/MCL
BASOPHILS NFR BLD AUTO: 0.2 %
EOSINOPHIL # BLD AUTO: 0.1 X10(3)/MCL (ref 0–0.9)
EOSINOPHIL NFR BLD AUTO: 0.8 %
ERYTHROCYTE [DISTWIDTH] IN BLOOD BY AUTOMATED COUNT: 15 % (ref 11.5–17)
HCT VFR BLD AUTO: 25.3 % (ref 37–47)
HGB BLD-MCNC: 7.9 G/DL (ref 12–16)
IMM GRANULOCYTES # BLD AUTO: 0.07 X10(3)/MCL (ref 0–0.04)
IMM GRANULOCYTES NFR BLD AUTO: 0.5 %
LYMPHOCYTES # BLD AUTO: 1.6 X10(3)/MCL (ref 0.6–4.6)
LYMPHOCYTES NFR BLD AUTO: 12.3 %
MCH RBC QN AUTO: 22.7 PG (ref 27–31)
MCHC RBC AUTO-ENTMCNC: 31.2 G/DL (ref 33–36)
MCV RBC AUTO: 72.7 FL (ref 80–94)
MONOCYTES # BLD AUTO: 0.78 X10(3)/MCL (ref 0.1–1.3)
MONOCYTES NFR BLD AUTO: 6 %
NEUTROPHILS # BLD AUTO: 10.49 X10(3)/MCL (ref 2.1–9.2)
NEUTROPHILS NFR BLD AUTO: 80.2 %
NRBC BLD AUTO-RTO: 0 %
PLATELET # BLD AUTO: 133 X10(3)/MCL (ref 130–400)
PLATELETS.RETICULATED NFR BLD AUTO: 13.4 % (ref 0.9–11.2)
PMV BLD AUTO: ABNORMAL FL
RBC # BLD AUTO: 3.48 X10(6)/MCL (ref 4.2–5.4)
WBC # SPEC AUTO: 13.06 X10(3)/MCL (ref 4.5–11.5)

## 2024-01-01 PROCEDURE — 11000001 HC ACUTE MED/SURG PRIVATE ROOM

## 2024-01-01 PROCEDURE — 85025 COMPLETE CBC W/AUTO DIFF WBC: CPT | Performed by: OBSTETRICS & GYNECOLOGY

## 2024-01-01 PROCEDURE — 25000003 PHARM REV CODE 250: Performed by: OBSTETRICS & GYNECOLOGY

## 2024-01-01 PROCEDURE — 63600175 PHARM REV CODE 636 W HCPCS: Performed by: OBSTETRICS & GYNECOLOGY

## 2024-01-01 RX ADMIN — IBUPROFEN 800 MG: 800 TABLET, FILM COATED ORAL at 02:01

## 2024-01-01 RX ADMIN — DOCUSATE SODIUM 200 MG: 100 CAPSULE, LIQUID FILLED ORAL at 09:01

## 2024-01-01 RX ADMIN — IBUPROFEN 800 MG: 800 TABLET, FILM COATED ORAL at 10:01

## 2024-01-01 RX ADMIN — PRENATAL VITAMINS-IRON FUMARATE 27 MG IRON-FOLIC ACID 0.8 MG TABLET 1 TABLET: at 09:01

## 2024-01-01 RX ADMIN — KETOROLAC TROMETHAMINE 30 MG: 30 INJECTION, SOLUTION INTRAMUSCULAR; INTRAVENOUS at 05:01

## 2024-01-01 NOTE — PROGRESS NOTES
[facility]Part[facility] Progress Note        Subjective:      Post-Operative Day #1 after  delivery secondary to previous CD in labor .  Denies dizziness, lightheadedness.      Patient is without complaints. Lochia less than menses. Breast feeding. Pain is well controlled. Patient is ambulating. Tolerating Full Regular diet.Overall mother and baby are doing well.     Objective:      Temp:  [97.8 °F (36.6 °C)-98.7 °F (37.1 °C)] 98.4 °F (36.9 °C)  Pulse:  [57-76] 76  Resp:  [16] 16  BP: ()/(52-66) 87/56    Intake/Output Summary (Last 24 hours) at 2024 1106  Last data filed at 2023 2139  Gross per 24 hour   Intake 1209.16 ml   Output 200 ml   Net 1009.16 ml     Body mass index is 25 kg/m².    General: no acute distress  Abdomen: soft, non-tender, non-distended; Fundus firm and below the umbilicus  Incision- intact, healing well, no sign of infection  Extremities: non-tender, symmetric, trace edema    Group & Rh   Date Value Ref Range Status   2023 B POS  Final     Recent Results (from the past 336 hour(s))   CBC with Differential    Collection Time: 24  4:36 AM   Result Value Ref Range    WBC 13.06 (H) 4.50 - 11.50 x10(3)/mcL    Hgb 7.9 (L) 12.0 - 16.0 g/dL    Hct 25.3 (L) 37.0 - 47.0 %    Platelet 133 130 - 400 x10(3)/mcL   CBC with Differential    Collection Time: 23  8:36 AM   Result Value Ref Range    WBC 19.45 (H) 4.50 - 11.50 x10(3)/mcL    Hgb 9.3 (L) 12.0 - 16.0 g/dL    Hct 30.4 (L) 37.0 - 47.0 %    Platelet 169 130 - 400 x10(3)/mcL          Assessment:     33 y.o.  S/P  Delivery Post-Operative Day #1  - Doing Well      Plan:     1. Continue routine postpartum care  2. Plan for D/C in AM

## 2024-01-01 NOTE — PLAN OF CARE
"  Problem:  Fall Injury Risk  Goal: Absence of Fall, Infant Drop and Related Injury  Outcome: Ongoing, Progressing     Problem: Adult Inpatient Plan of Care  Goal: Plan of Care Review  Outcome: Ongoing, Progressing  Goal: Patient-Specific Goal (Individualized)  Outcome: Ongoing, Progressing  Flowsheets (Taken 2023)  Individualized Care Needs: "pain mangement"  Goal: Absence of Hospital-Acquired Illness or Injury  Outcome: Ongoing, Progressing  Goal: Optimal Comfort and Wellbeing  Outcome: Ongoing, Progressing  Goal: Readiness for Transition of Care  Outcome: Ongoing, Progressing     Problem: Infection  Goal: Absence of Infection Signs and Symptoms  Outcome: Ongoing, Progressing     "

## 2024-01-01 NOTE — PLAN OF CARE
"  Problem:  Fall Injury Risk  Goal: Absence of Fall, Infant Drop and Related Injury  Outcome: Ongoing, Progressing     Problem: Adult Inpatient Plan of Care  Goal: Plan of Care Review  Outcome: Ongoing, Progressing  Goal: Patient-Specific Goal (Individualized)  Outcome: Ongoing, Progressing  Flowsheets (Taken 2023)  Individualized Care Needs: "pain mangement"  Goal: Absence of Hospital-Acquired Illness or Injury  Outcome: Ongoing, Progressing  Goal: Optimal Comfort and Wellbeing  Outcome: Ongoing, Progressing  Goal: Readiness for Transition of Care  Outcome: Ongoing, Progressing     Problem: Infection  Goal: Absence of Infection Signs and Symptoms  Outcome: Ongoing, Progressing     Problem: Adjustment to Role Transition (Postpartum  Delivery)  Goal: Successful Maternal Role Transition  2023 by Sailaja Tabares RN  Outcome: Ongoing, Progressing  2023 by Sailaja Tabares RN  Outcome: Ongoing, Progressing     Problem: Bleeding (Postpartum  Delivery)  Goal: Hemostasis  2023 by Sailaja Tabares RN  Outcome: Ongoing, Progressing  2023 by Sailaja Tabares RN  Outcome: Ongoing, Progressing     Problem: Infection (Postpartum  Delivery)  Goal: Absence of Infection Signs and Symptoms  2023 by Sailaja Tabares RN  Outcome: Ongoing, Progressing  2023 by Sailaja Tabares RN  Outcome: Ongoing, Progressing     Problem: Pain (Postpartum  Delivery)  Goal: Acceptable Pain Control  2023 by Sailaja Tabares RN  Outcome: Ongoing, Progressing  2023 by Sailaja Tabares RN  Outcome: Ongoing, Progressing     Problem: Postoperative Nausea and Vomiting (Postpartum  Delivery)  Goal: Nausea and Vomiting Relief  2023 by Sailaja Tabares RN  Outcome: Ongoing, Progressing  2023 by Sailaja Tabares RN  Outcome: Ongoing, Progressing     Problem: Postoperative Urinary Retention (Postpartum "  Delivery)  Goal: Effective Urinary Elimination  Outcome: Ongoing, Progressing

## 2024-01-02 ENCOUNTER — LACTATION ENCOUNTER (OUTPATIENT)
Dept: OBSTETRICS AND GYNECOLOGY | Facility: HOSPITAL | Age: 34
End: 2024-01-02

## 2024-01-02 PROCEDURE — 25000003 PHARM REV CODE 250: Performed by: OBSTETRICS & GYNECOLOGY

## 2024-01-02 RX ADMIN — IBUPROFEN 800 MG: 800 TABLET, FILM COATED ORAL at 05:01

## 2024-01-02 NOTE — PLAN OF CARE
Problem:  Fall Injury Risk  Goal: Absence of Fall, Infant Drop and Related Injury  Outcome: Ongoing, Progressing     Problem: Adult Inpatient Plan of Care  Goal: Plan of Care Review  Outcome: Ongoing, Progressing  Goal: Patient-Specific Goal (Individualized)  Outcome: Ongoing, Progressing  Flowsheets (Taken 2024)  Individualized Care Needs: To go home with a healthy baby  Goal: Absence of Hospital-Acquired Illness or Injury  Outcome: Ongoing, Progressing  Goal: Optimal Comfort and Wellbeing  Outcome: Ongoing, Progressing  Goal: Readiness for Transition of Care  Outcome: Ongoing, Progressing     Problem: Infection  Goal: Absence of Infection Signs and Symptoms  Outcome: Ongoing, Progressing     Problem: Adjustment to Role Transition (Postpartum  Delivery)  Goal: Successful Maternal Role Transition  Outcome: Ongoing, Progressing     Problem: Bleeding (Postpartum  Delivery)  Goal: Hemostasis  Outcome: Ongoing, Progressing     Problem: Infection (Postpartum  Delivery)  Goal: Absence of Infection Signs and Symptoms  Outcome: Ongoing, Progressing     Problem: Pain (Postpartum  Delivery)  Goal: Acceptable Pain Control  Outcome: Ongoing, Progressing     Problem: Postoperative Nausea and Vomiting (Postpartum  Delivery)  Goal: Nausea and Vomiting Relief  Outcome: Ongoing, Progressing     Problem: Postoperative Urinary Retention (Postpartum  Delivery)  Goal: Effective Urinary Elimination  Outcome: Ongoing, Progressing

## 2024-01-02 NOTE — LACTATION NOTE
"This note was copied from a baby's chart.  Experienced mom says feeds are going well. Verbalized comfortable latch and frequent feeds. Discharge instructions reviewed. Verbalized understanding of all. Mom has a pump for home use.      [facility]        [phone]  Discharge Instructions    Watch for early feeding cues (rooting, hand to mouth, smacking lips, sticking out tongue). Offer the breast at the first signs of hunger. Crying is a late sign of hunger; don't wait until then.    Feed your baby at least 8-12 times in a 24-hour period. Feeding early and often will ensure a plentiful milk supply for you and your baby and will prevent engorgement in the coming days.  Do not limit or schedule feedings.    "Cluster feeding" is normal; baby may nurse very often for several times in a row. This commonly occurs in the evening or early part of the night.    Allow your baby to finish one side before offering the other. You can try to burp the baby and then offer the other breast if he/ she seems to still be hungry.     Skin to skin contact helps a sleepy baby want to nurse. Babies who are frequently held skin to skin nurse better and longer. Skin to skin increases mom's milk-making hormone levels as well. Skin to skin can help calm baby too.     By the end of the first week, you want to see 6-8 wet diapers per day and 3-5 yellow, seedy stools (stools will change from black to green to yellow by the end of the 1st week. Refer to chart in breastfeeding booklet to see how many wet/ dirty diapers baby should be having each day. Notify pediatrician if baby is not having enough wet and dirty diapers.    It is best to avoid bottles and pacifiers for the first 4 weeks while getting breastfeeding established.     Back to work or school: 4 weeks is a good time to start pumping after morning feeds in order to store milk for baby, although you may pump before if needed. Around 4-6 weeks is a good time to introduce a bottle " of pumped milk to baby if you will go back to work or school.     You should feel a tugging or pulling sensation when your baby nurses; it should never feel sharp, pinching, or singing. If there is pain, try to adjust the latch. Make sure your baby opens his mouth wide to latch on. His lips should be flanged out, like a fish. (You may want to refer to the handouts in your packet or view latch videos at [employer] or [employer] â€“ [employer].    Listen for swallowing. This indicates your baby is transferring that milk!     Your milk will increase between days 3-5. Frequent feeds can help with engorgement.     If your breasts begin to get engorged, place warm cloths on them or  a warm shower before feeding. This will help the milk begin to flow. Feed often to drain the breasts. After feeding, you may use cold packs for 10-15 minutes to reduce swelling. You may also want to pump for comfort; don't overdo it- just pump enough to relieve the fullness.     No soap or lotions to the nipples except for medical grade lanolin or nipple cream for soreness.     All babies go through growth spurts. The first one is generally around 2-3 weeks. If your baby starts to nurse a lot more than usual, this is likely the reason. Growth spurts happen every so often and usually last for 3-5 days.     Remember to check the safety of any medications, prescription or non-prescription (including herbals), before you take them. Your baby's pediatrician is the best one to confirm the safety of the medication while you are breastfeeding. You may also phone us. We can tell you about safety ratings that have been published regarding a particular medication. You may wish to phone the [facility] at [phone] to check the safety of a medication.     Call with any questions or concerns. Don't wait-- ask for help early. Breastfeeding Resources can be found on the last few pages of your Breastfeeding Booklet given to you in the  [facility].

## 2024-01-02 NOTE — DISCHARGE SUMMARY
"Delivery Discharge Summary  Obstetrics      Primary OB Clinician: Yarely Vásquez MD    Discharge Provider: CASSIE ROMERO NP    Admission date: 2023  Discharge date: 2024    Admit Dx:   Discharge Dx:    Patient Active Problem List   Diagnosis    Abdominal wall swelling    Epigastric hernia    Previous  section     delivery delivered       Procedure: , due to [facility]    Hospital Course:  Anastacia Dunham is a 33 y.o. now  who was admitted on 2023 for delivery. Patient delivered a viable . Please see delivery note for further details. Pt was in stable condition post delivery and was transferred to the Mother-Baby Unit. Her postpartum course was uncomplicated. On the date of discharge, patient's pain is controlled with oral pain medications. She is tolerating ambulation without SOB or CP, and PO diet without N/V. Reported lochia is within the normal range. Pt in stable condition and ready for discharge.     Pertinent studies:  Postpartum CBC  Lab Results   Component Value Date    WBC 13.06 (H) 2024    HGB 7.9 (L) 2024    HCT 25.3 (L) 2024    MCV 72.7 (L) 2024     2024       Delivery:    Episiotomy: None   Lacerations: None   Repair suture:     Repair # of packets:     Blood loss (ml):       Birth information:  YOB: 2021   Time of birth: 9:02 AM   Sex: female   Delivery type: , Low Transverse   Gestational Age: 38w5d     Measurements    Weight: 2830 g  Weight (lbs): 6 lb 3.8 oz  Length: 48.3 cm  Length (in): 19"  Head circumference: 33.7 cm         Delivery Clinician: Delivery Providers    Delivering clinician: Marge Beyer MD          Additional  information:  Forceps:    Vacuum:    Breech:    Observed anomalies      Living?:     Apgars    Living status: Living  Apgar Component Scores:  1 min.:  5 min.:  10 min.:  15 min.:  20 min.:    Skin color:  0  1       Heart rate:  2  2       Reflex " irritability:  2  2       Muscle tone:  2  2       Respiratory effort:  2  2       Total:  8  9       Apgars assigned by: VALERY PULIDO         Placenta: Delivered:       appearance    Disposition: To home, self care    Follow Up: 2 weeks    Patient Instructions:   1. Call the office for any bleeding >2 pads/hour for >2 hours, temperature >100.4, pain that is uncontrolled with medications, or for any other concerns.  2. Pelvic rest and no tub baths x 6 weeks.  3. No driving while on narcotics.    Current Discharge Medication List        CONTINUE these medications which have NOT CHANGED    Details   prenatal vit/iron fum/folic ac (PRENATAL 1+1 ORAL) Take by mouth.             CASSIE ROMERO

## 2024-01-03 ENCOUNTER — ANESTHESIA (OUTPATIENT)
Dept: OBSTETRICS AND GYNECOLOGY | Facility: HOSPITAL | Age: 34
End: 2024-01-03
Payer: COMMERCIAL

## 2024-01-03 VITALS
HEART RATE: 88 BPM | BODY MASS INDEX: 25.13 KG/M2 | SYSTOLIC BLOOD PRESSURE: 105 MMHG | TEMPERATURE: 99 F | OXYGEN SATURATION: 99 % | DIASTOLIC BLOOD PRESSURE: 73 MMHG | HEIGHT: 60 IN | RESPIRATION RATE: 18 BRPM | WEIGHT: 128 LBS

## 2024-01-03 LAB — PSYCHE PATHOLOGY RESULT: NORMAL

## (undated) DEVICE — BULB SYRINGE EAR IRRIGATION

## (undated) DEVICE — SUT CTD VICRYL 0 UND BR CT

## (undated) DEVICE — SOL NACL IRR 1000ML BTL

## (undated) DEVICE — CAP BABY BEANIE

## (undated) DEVICE — SEE MEDLINE ITEM 156931

## (undated) DEVICE — SUT 0 36IN PDS II VIO MONO

## (undated) DEVICE — BINDER ABDOM 4PANEL 12IN LG/XL

## (undated) DEVICE — SUT CHROMIC GUT 2-0 CT-1 27IN

## (undated) DEVICE — PAD SANITARY OB STERILE

## (undated) DEVICE — Device

## (undated) DEVICE — SUT 2-0 VICRYL / CT-1

## (undated) DEVICE — SEE MEDLINE ITEM 157117

## (undated) DEVICE — SYS CLSR DERMABOND PRINEO 22CM

## (undated) DEVICE — SUT 3/0 36IN COATED VICRYL

## (undated) DEVICE — PAD UNDERPAD 30X30

## (undated) DEVICE — ELECTRODE REM PLYHSV RETURN 9

## (undated) DEVICE — SOL WATER STRL IRR 1000ML